# Patient Record
Sex: FEMALE | URBAN - METROPOLITAN AREA
[De-identification: names, ages, dates, MRNs, and addresses within clinical notes are randomized per-mention and may not be internally consistent; named-entity substitution may affect disease eponyms.]

---

## 2022-07-09 ENCOUNTER — TELEPHONE ENCOUNTER (OUTPATIENT)
Dept: URBAN - METROPOLITAN AREA CLINIC 121 | Facility: CLINIC | Age: 37
End: 2022-07-09

## 2022-07-10 ENCOUNTER — TELEPHONE ENCOUNTER (OUTPATIENT)
Dept: URBAN - METROPOLITAN AREA CLINIC 121 | Facility: CLINIC | Age: 37
End: 2022-07-10

## 2022-07-10 RX ORDER — SUCRALFATE 1 G/1
TABLET ORAL
Refills: 0 | Status: ACTIVE | COMMUNITY
Start: 2018-10-29

## 2022-07-10 RX ORDER — OMEPRAZOLE 40 MG/1
CAPSULE, DELAYED RELEASE ORAL TWICE A DAY
Refills: 0 | Status: ACTIVE | COMMUNITY
Start: 2018-10-29

## 2022-10-24 ENCOUNTER — DASHBOARD ENCOUNTERS (OUTPATIENT)
Age: 37
End: 2022-10-24

## 2022-10-24 ENCOUNTER — OFFICE VISIT (OUTPATIENT)
Dept: URBAN - METROPOLITAN AREA CLINIC 63 | Facility: CLINIC | Age: 37
End: 2022-10-24
Payer: COMMERCIAL

## 2022-10-24 VITALS
HEART RATE: 76 BPM | BODY MASS INDEX: 33.96 KG/M2 | HEIGHT: 60 IN | WEIGHT: 173 LBS | OXYGEN SATURATION: 99 % | DIASTOLIC BLOOD PRESSURE: 62 MMHG | TEMPERATURE: 97.7 F | SYSTOLIC BLOOD PRESSURE: 112 MMHG

## 2022-10-24 DIAGNOSIS — R10.13 EPIGASTRIC PAIN: ICD-10-CM

## 2022-10-24 PROCEDURE — 99203 OFFICE O/P NEW LOW 30 MIN: CPT | Performed by: NURSE PRACTITIONER

## 2022-10-24 RX ORDER — SUCRALFATE 1 G/1
TABLET ORAL
Refills: 0 | COMMUNITY
Start: 2018-10-29

## 2022-10-24 RX ORDER — OMEPRAZOLE 40 MG/1
CAPSULE, DELAYED RELEASE ORAL TWICE A DAY
Refills: 0 | COMMUNITY
Start: 2018-10-29

## 2022-10-24 NOTE — HPI-HPI
Thank you very much for kindly referring Ludy Hoffmann, a very pleasant 36-year-old female, back to our service for epigastric pain.  Past medical history significant for asthma, nephrolithiasis and migraines.  Past surgical history significant for  x2.  Her last EGD was 2018 and was significant for erosive duodenitis and her last colonoscopy was over 3 October 2017 that was unremarkable.  Ludy presents today complaining of postprandial "stabbing and bloating" that is uncomfortable in her epigastric/left upper quadrant region.  She has also had some nausea and vomiting but denies heartburn.  All of the symptoms started approximately 2 months ago and have slightly improved since she started eliminating dietary triggers.  Unfortunately, she states garlic and onions are "very problematic" and avoidance has been helpful.  Since she has had a change in diet, her nausea and vomiting has resolved but her pain has persisted albeit improved.  Her bowel movements are now "more regular" since avoiding dairy and her problematic foods and she will have some diarrhea and bloating when she indulges in dairy products.  2 months ago she also started omeprazole, once daily and sucralfate as needed and relates that she does not smoke tobacco, drink EtOH but does use marijuana nightly for anxiety and to help her sleep.  She is otherwise asymptomatic from a GI perspective and denies dysphagia, persistent abdominal pain, change in bowel habits, rectal bleeding, melena or unintentional weight loss.

## 2022-10-24 NOTE — HPI-TODAY'S VISIT:
PMH: Asthma, nephrolithiasis, migraine PSH:  x2, EGD, colonoscopy  2 mo ago, post-prandial "stabbing and boating" epi/LLQ pain, N/V, no heartburn, then changed diet to soft foods, things improved and found garlic and onions are VERY problematic. Avoidance is now helpful. N/V is gone, pain improved.  Now BMs are more regular  since avoiding dairy, some diarrhea with dairy indulgence. No blood.   started omep and sucralfate PRN, smokes MJ nightly

## 2022-10-24 NOTE — HPI-PREVIOUS IMAGING
CT abdomen and pelvis without contrast/04 October 2018.  Findings suspicious for infectious or inflammatory colitis.  Hepatomegaly.

## 2022-10-24 NOTE — HPI-PREVIOUS PROCEDURES
EGD/06 October 2018 (Ac Franco MD).  Erosive duodenitis not actively bleeding moderately severe/mild antritis. ********** Colonoscopy/03 October 2017 (Ac Franco MD).  Internal hemorrhoids grade 1 and 2, nonbleeding.